# Patient Record
Sex: FEMALE | Race: WHITE | NOT HISPANIC OR LATINO | Employment: OTHER | URBAN - METROPOLITAN AREA
[De-identification: names, ages, dates, MRNs, and addresses within clinical notes are randomized per-mention and may not be internally consistent; named-entity substitution may affect disease eponyms.]

---

## 2023-01-30 PROBLEM — E78.00 ELEVATED LDL CHOLESTEROL LEVEL: Status: ACTIVE | Noted: 2023-01-30

## 2023-01-30 PROBLEM — R10.13 INTRACTABLE EPIGASTRIC ABDOMINAL PAIN: Status: ACTIVE | Noted: 2023-01-30

## 2023-01-30 PROBLEM — I10 ESSENTIAL (PRIMARY) HYPERTENSION: Status: ACTIVE | Noted: 2023-01-30

## 2023-01-30 PROBLEM — E87.6 LOW BLOOD POTASSIUM: Status: ACTIVE | Noted: 2023-01-30

## 2023-01-30 PROBLEM — B02.9 SHINGLES: Status: ACTIVE | Noted: 2023-01-30

## 2023-01-30 PROBLEM — H65.91 RIGHT SEROUS OTITIS MEDIA: Status: ACTIVE | Noted: 2023-01-30

## 2023-01-30 PROBLEM — M85.80 OSTEOPENIA: Status: ACTIVE | Noted: 2023-01-30

## 2023-01-30 PROBLEM — U07.1 COVID-19: Status: ACTIVE | Noted: 2023-01-30

## 2023-01-30 PROBLEM — J06.9 VIRAL URI WITH COUGH: Status: ACTIVE | Noted: 2023-01-30

## 2023-01-30 PROBLEM — K64.9 ACUTE HEMORRHOID: Status: ACTIVE | Noted: 2023-01-30

## 2023-01-30 RX ORDER — LOSARTAN POTASSIUM 100 MG/1
1 TABLET ORAL DAILY
COMMUNITY
Start: 2019-10-02 | End: 2023-05-18

## 2023-01-30 RX ORDER — ALENDRONATE SODIUM 70 MG/1
1 TABLET ORAL
COMMUNITY
Start: 2022-10-03 | End: 2023-03-14

## 2023-01-30 RX ORDER — AMLODIPINE BESYLATE 2.5 MG/1
1 TABLET ORAL DAILY
COMMUNITY
Start: 2022-01-14 | End: 2023-04-24

## 2023-01-30 RX ORDER — CHLORTHALIDONE 25 MG/1
1 TABLET ORAL DAILY
COMMUNITY
Start: 2021-05-03 | End: 2023-08-16

## 2023-01-30 RX ORDER — PHENOL 1.4 %
1 AEROSOL, SPRAY (ML) MUCOUS MEMBRANE DAILY
COMMUNITY
Start: 2020-03-09

## 2023-01-30 RX ORDER — POTASSIUM CHLORIDE 750 MG/1
1 TABLET, EXTENDED RELEASE ORAL DAILY
COMMUNITY
Start: 2022-04-26 | End: 2023-09-28

## 2023-01-30 RX ORDER — ASCORBIC ACID 500 MG
1 TABLET ORAL DAILY
COMMUNITY

## 2023-01-30 RX ORDER — ATORVASTATIN CALCIUM 10 MG/1
1 TABLET, FILM COATED ORAL DAILY
COMMUNITY
End: 2023-03-14

## 2023-03-14 ENCOUNTER — OFFICE VISIT (OUTPATIENT)
Dept: PRIMARY CARE | Facility: CLINIC | Age: 68
End: 2023-03-14
Payer: MEDICARE

## 2023-03-14 VITALS
DIASTOLIC BLOOD PRESSURE: 72 MMHG | HEIGHT: 64 IN | HEART RATE: 76 BPM | SYSTOLIC BLOOD PRESSURE: 124 MMHG | OXYGEN SATURATION: 98 % | WEIGHT: 160.4 LBS | BODY MASS INDEX: 27.39 KG/M2

## 2023-03-14 DIAGNOSIS — M85.88 OSTEOPENIA OF LUMBAR SPINE: Primary | ICD-10-CM

## 2023-03-14 DIAGNOSIS — I10 ESSENTIAL (PRIMARY) HYPERTENSION: ICD-10-CM

## 2023-03-14 DIAGNOSIS — E78.00 ELEVATED LDL CHOLESTEROL LEVEL: ICD-10-CM

## 2023-03-14 PROBLEM — E87.6 LOW BLOOD POTASSIUM: Status: RESOLVED | Noted: 2023-01-30 | Resolved: 2023-03-14

## 2023-03-14 PROBLEM — U07.1 COVID-19: Status: RESOLVED | Noted: 2023-01-30 | Resolved: 2023-03-14

## 2023-03-14 PROBLEM — J06.9 VIRAL URI WITH COUGH: Status: RESOLVED | Noted: 2023-01-30 | Resolved: 2023-03-14

## 2023-03-14 PROBLEM — H65.91 RIGHT SEROUS OTITIS MEDIA: Status: RESOLVED | Noted: 2023-01-30 | Resolved: 2023-03-14

## 2023-03-14 PROBLEM — K64.9 ACUTE HEMORRHOID: Status: RESOLVED | Noted: 2023-01-30 | Resolved: 2023-03-14

## 2023-03-14 PROBLEM — B02.9 SHINGLES: Status: RESOLVED | Noted: 2023-01-30 | Resolved: 2023-03-14

## 2023-03-14 PROBLEM — R10.13 INTRACTABLE EPIGASTRIC ABDOMINAL PAIN: Status: RESOLVED | Noted: 2023-01-30 | Resolved: 2023-03-14

## 2023-03-14 PROCEDURE — 1159F MED LIST DOCD IN RCRD: CPT | Performed by: NURSE PRACTITIONER

## 2023-03-14 PROCEDURE — 3074F SYST BP LT 130 MM HG: CPT | Performed by: NURSE PRACTITIONER

## 2023-03-14 PROCEDURE — 1160F RVW MEDS BY RX/DR IN RCRD: CPT | Performed by: NURSE PRACTITIONER

## 2023-03-14 PROCEDURE — 1036F TOBACCO NON-USER: CPT | Performed by: NURSE PRACTITIONER

## 2023-03-14 PROCEDURE — 3078F DIAST BP <80 MM HG: CPT | Performed by: NURSE PRACTITIONER

## 2023-03-14 PROCEDURE — 99214 OFFICE O/P EST MOD 30 MIN: CPT | Performed by: NURSE PRACTITIONER

## 2023-03-14 RX ORDER — HYDROCORTISONE 25 MG/G
CREAM TOPICAL
COMMUNITY
Start: 2022-11-15 | End: 2024-03-18 | Stop reason: ALTCHOICE

## 2023-03-14 NOTE — PATIENT INSTRUCTIONS
I placed an order for your mammogram you can complete when this is due.  I also ordered your routine blood work for you to complete in 6 months prior to your follow-up which should be a Medicare wellness.    Continue to go to the [facility] and work out continue to do resistance training as this can help with your osteopenia.  Also make sure you are taking vitamin D and calcium and eating a healthy well-balanced diet    Please follow-up per routine and as needed

## 2023-03-14 NOTE — ASSESSMENT & PLAN NOTE
Pt has not started taking the Fosamax.  She decided she did not want to try as it had too many side effects. She will take calcium and vitamin diet.  Recommend low weights she goes to [facility]

## 2023-03-14 NOTE — PROGRESS NOTES
"Subjective   Patient ID: Lis Brady is a 67 y.o. female who presents for Follow-up (6 mo).    Pt here for routine 6 months follow up       Review of Systems   All other systems reviewed and are negative.      Objective   /72   Pulse 76   Ht 1.626 m (5' 4\")   Wt 72.8 kg (160 lb 6.4 oz)   SpO2 98%   BMI 27.53 kg/m²     Physical Exam  Constitutional:       Appearance: Normal appearance. She is normal weight.   HENT:      Nose: Nose normal.   Cardiovascular:      Rate and Rhythm: Regular rhythm.      Heart sounds: Normal heart sounds.   Pulmonary:      Effort: Pulmonary effort is normal.      Breath sounds: Normal breath sounds.   Abdominal:      General: Bowel sounds are normal.      Palpations: Abdomen is soft.   Musculoskeletal:         General: Normal range of motion.   Skin:     General: Skin is warm.      Capillary Refill: Capillary refill takes less than 2 seconds.   Neurological:      General: No focal deficit present.   Psychiatric:         Mood and Affect: Mood normal.       Assessment/Plan   Problem List Items Addressed This Visit          Circulatory    Essential (primary) hypertension     Bp stable ,no changes to current medications            Musculoskeletal    Osteopenia - Primary     Pt has not started taking the Fosamax.  She decided she did not want to try as it had too many side effects. She will take calcium and vitamin diet.  Recommend low weights she goes to [facility]            Other    Elevated LDL cholesterol level     Continues to watch diet and exercise               "

## 2023-04-23 DIAGNOSIS — I10 ESSENTIAL (PRIMARY) HYPERTENSION: Primary | ICD-10-CM

## 2023-04-24 RX ORDER — AMLODIPINE BESYLATE 2.5 MG/1
2.5 TABLET ORAL DAILY
Qty: 90 TABLET | Refills: 2 | Status: SHIPPED | OUTPATIENT
Start: 2023-04-24 | End: 2023-10-30

## 2023-05-17 DIAGNOSIS — I10 ESSENTIAL (PRIMARY) HYPERTENSION: ICD-10-CM

## 2023-05-18 RX ORDER — LOSARTAN POTASSIUM 100 MG/1
100 TABLET ORAL DAILY
Qty: 90 TABLET | Refills: 2 | Status: SHIPPED | OUTPATIENT
Start: 2023-05-18 | End: 2023-11-20

## 2023-07-05 ENCOUNTER — OFFICE VISIT (OUTPATIENT)
Dept: PRIMARY CARE | Facility: CLINIC | Age: 68
End: 2023-07-05
Payer: MEDICARE

## 2023-07-05 VITALS
WEIGHT: 164.2 LBS | SYSTOLIC BLOOD PRESSURE: 128 MMHG | BODY MASS INDEX: 28.03 KG/M2 | OXYGEN SATURATION: 98 % | HEART RATE: 79 BPM | DIASTOLIC BLOOD PRESSURE: 80 MMHG | HEIGHT: 64 IN

## 2023-07-05 DIAGNOSIS — J01.00 ACUTE NON-RECURRENT MAXILLARY SINUSITIS: Primary | ICD-10-CM

## 2023-07-05 PROBLEM — Q82.8 POROKERATOSIS: Status: ACTIVE | Noted: 2017-05-17

## 2023-07-05 PROCEDURE — 99213 OFFICE O/P EST LOW 20 MIN: CPT | Performed by: NURSE PRACTITIONER

## 2023-07-05 PROCEDURE — 3074F SYST BP LT 130 MM HG: CPT | Performed by: NURSE PRACTITIONER

## 2023-07-05 PROCEDURE — 1160F RVW MEDS BY RX/DR IN RCRD: CPT | Performed by: NURSE PRACTITIONER

## 2023-07-05 PROCEDURE — 1159F MED LIST DOCD IN RCRD: CPT | Performed by: NURSE PRACTITIONER

## 2023-07-05 PROCEDURE — 1036F TOBACCO NON-USER: CPT | Performed by: NURSE PRACTITIONER

## 2023-07-05 PROCEDURE — 3079F DIAST BP 80-89 MM HG: CPT | Performed by: NURSE PRACTITIONER

## 2023-07-05 RX ORDER — AMOXICILLIN AND CLAVULANATE POTASSIUM 875; 125 MG/1; MG/1
1 TABLET, FILM COATED ORAL 2 TIMES DAILY
Qty: 20 TABLET | Refills: 0 | Status: SHIPPED | OUTPATIENT
Start: 2023-07-05 | End: 2023-07-15

## 2023-07-05 ASSESSMENT — PATIENT HEALTH QUESTIONNAIRE - PHQ9
2. FEELING DOWN, DEPRESSED OR HOPELESS: NOT AT ALL
SUM OF ALL RESPONSES TO PHQ9 QUESTIONS 1 AND 2: 0
1. LITTLE INTEREST OR PLEASURE IN DOING THINGS: NOT AT ALL

## 2023-07-05 ASSESSMENT — ENCOUNTER SYMPTOMS
HEADACHES: 1
SORE THROAT: 1
SINUS PRESSURE: 1
COUGH: 1
SHORTNESS OF BREATH: 0
DEPRESSION: 0
OCCASIONAL FEELINGS OF UNSTEADINESS: 0
LOSS OF SENSATION IN FEET: 0

## 2023-07-05 ASSESSMENT — COLUMBIA-SUICIDE SEVERITY RATING SCALE - C-SSRS
1. IN THE PAST MONTH, HAVE YOU WISHED YOU WERE DEAD OR WISHED YOU COULD GO TO SLEEP AND NOT WAKE UP?: NO
2. HAVE YOU ACTUALLY HAD ANY THOUGHTS OF KILLING YOURSELF?: NO
6. HAVE YOU EVER DONE ANYTHING, STARTED TO DO ANYTHING, OR PREPARED TO DO ANYTHING TO END YOUR LIFE?: NO

## 2023-07-05 NOTE — PROGRESS NOTES
"Subjective   Patient ID: Lis Brady is a 67 y.o. female who presents for Sinusitis and Headache (Sinus ).    Sinusitis  This is a new problem. The current episode started more than 1 month ago. The problem has been waxing and waning since onset. There has been no fever. The fever has been present for 5 days or more. Her pain is at a severity of 2/10. The pain is moderate. Associated symptoms include congestion, coughing, headaches, sinus pressure and a sore throat. Pertinent negatives include no ear pain or shortness of breath. Past treatments include nasal decongestants and saline nose sprays. The treatment provided mild relief.   Headache   This is a new problem. The current episode started 1 to 4 weeks ago. The problem occurs intermittently. The pain is located in the Frontal region. The pain does not radiate. The quality of the pain is described as aching. Associated symptoms include coughing, sinus pressure and a sore throat. Pertinent negatives include no ear pain. She has tried NSAIDs for the symptoms. The treatment provided mild relief.        Review of Systems   HENT:  Positive for congestion, sinus pressure and sore throat. Negative for ear pain.    Respiratory:  Positive for cough. Negative for shortness of breath.    Neurological:  Positive for headaches.       Objective   /80   Pulse 79   Ht 1.626 m (5' 4\")   Wt 74.5 kg (164 lb 3.2 oz)   SpO2 98%   BMI 28.18 kg/m²     Physical Exam  Vitals reviewed.   Constitutional:       Appearance: Normal appearance.   HENT:      Head: Normocephalic.      Salivary Glands: Right salivary gland is not diffusely enlarged or tender. Left salivary gland is not diffusely enlarged or tender.      Right Ear: Tympanic membrane, ear canal and external ear normal.      Left Ear: Tympanic membrane, ear canal and external ear normal.      Nose: Mucosal edema and congestion present.      Right Turbinates: Swollen.      Left Turbinates: Swollen.      Right Sinus: " Frontal sinus tenderness present.      Left Sinus: Frontal sinus tenderness present.   Cardiovascular:      Rate and Rhythm: Normal rate and regular rhythm.      Heart sounds: Normal heart sounds.   Pulmonary:      Effort: Pulmonary effort is normal.      Breath sounds: Normal breath sounds.   Skin:     General: Skin is warm.      Capillary Refill: Capillary refill takes less than 2 seconds.   Neurological:      Mental Status: She is alert and oriented to person, place, and time.   Psychiatric:         Mood and Affect: Mood and affect normal.         Speech: Speech normal.         Assessment/Plan   Problem List Items Addressed This Visit    None  Visit Diagnoses       Acute non-recurrent maxillary sinusitis    -  Primary    Relevant Medications    amoxicillin-pot clavulanate (Augmentin) 875-125 mg tablet

## 2023-08-15 DIAGNOSIS — I10 ESSENTIAL (PRIMARY) HYPERTENSION: Primary | ICD-10-CM

## 2023-08-16 RX ORDER — CHLORTHALIDONE 25 MG/1
25 TABLET ORAL DAILY
Qty: 100 TABLET | Refills: 2 | Status: SHIPPED | OUTPATIENT
Start: 2023-08-16 | End: 2024-05-21 | Stop reason: SDUPTHER

## 2023-09-12 ENCOUNTER — TELEPHONE (OUTPATIENT)
Dept: PRIMARY CARE | Facility: CLINIC | Age: 68
End: 2023-09-12

## 2023-09-12 ENCOUNTER — LAB (OUTPATIENT)
Dept: LAB | Facility: LAB | Age: 68
End: 2023-09-12
Payer: MEDICARE

## 2023-09-12 DIAGNOSIS — I10 ESSENTIAL (PRIMARY) HYPERTENSION: ICD-10-CM

## 2023-09-12 DIAGNOSIS — E78.00 ELEVATED LDL CHOLESTEROL LEVEL: ICD-10-CM

## 2023-09-12 LAB
ALANINE AMINOTRANSFERASE (SGPT) (U/L) IN SER/PLAS: 13 U/L (ref 7–45)
ALBUMIN (G/DL) IN SER/PLAS: 4 G/DL (ref 3.4–5)
ALKALINE PHOSPHATASE (U/L) IN SER/PLAS: 69 U/L (ref 33–136)
ANION GAP IN SER/PLAS: 13 MMOL/L (ref 10–20)
ASPARTATE AMINOTRANSFERASE (SGOT) (U/L) IN SER/PLAS: 17 U/L (ref 9–39)
BILIRUBIN TOTAL (MG/DL) IN SER/PLAS: 0.5 MG/DL (ref 0–1.2)
CALCIUM (MG/DL) IN SER/PLAS: 9.3 MG/DL (ref 8.6–10.3)
CARBON DIOXIDE, TOTAL (MMOL/L) IN SER/PLAS: 30 MMOL/L (ref 21–32)
CHLORIDE (MMOL/L) IN SER/PLAS: 101 MMOL/L (ref 98–107)
CHOLESTEROL (MG/DL) IN SER/PLAS: 198 MG/DL (ref 0–199)
CHOLESTEROL IN HDL (MG/DL) IN SER/PLAS: 49.4 MG/DL
CHOLESTEROL/HDL RATIO: 4
CREATININE (MG/DL) IN SER/PLAS: 0.76 MG/DL (ref 0.5–1.05)
GFR FEMALE: 86 ML/MIN/1.73M2
GLUCOSE (MG/DL) IN SER/PLAS: 87 MG/DL (ref 74–99)
LDL: 125 MG/DL (ref 0–99)
POTASSIUM (MMOL/L) IN SER/PLAS: 3.7 MMOL/L (ref 3.5–5.3)
PROTEIN TOTAL: 6.3 G/DL (ref 6.4–8.2)
SODIUM (MMOL/L) IN SER/PLAS: 140 MMOL/L (ref 136–145)
TRIGLYCERIDE (MG/DL) IN SER/PLAS: 120 MG/DL (ref 0–149)
UREA NITROGEN (MG/DL) IN SER/PLAS: 15 MG/DL (ref 6–23)
VLDL: 24 MG/DL (ref 0–40)

## 2023-09-12 PROCEDURE — 80053 COMPREHEN METABOLIC PANEL: CPT

## 2023-09-12 PROCEDURE — 36415 COLL VENOUS BLD VENIPUNCTURE: CPT

## 2023-09-12 PROCEDURE — 80061 LIPID PANEL: CPT

## 2023-09-15 ENCOUNTER — OFFICE VISIT (OUTPATIENT)
Dept: PRIMARY CARE | Facility: CLINIC | Age: 68
End: 2023-09-15
Payer: MEDICARE

## 2023-09-15 VITALS
WEIGHT: 165.4 LBS | OXYGEN SATURATION: 99 % | HEIGHT: 64 IN | HEART RATE: 68 BPM | SYSTOLIC BLOOD PRESSURE: 122 MMHG | BODY MASS INDEX: 28.24 KG/M2 | DIASTOLIC BLOOD PRESSURE: 80 MMHG

## 2023-09-15 DIAGNOSIS — Z12.31 ENCOUNTER FOR SCREENING MAMMOGRAM FOR BREAST CANCER: ICD-10-CM

## 2023-09-15 DIAGNOSIS — Z00.00 ROUTINE GENERAL MEDICAL EXAMINATION AT HEALTH CARE FACILITY: Primary | ICD-10-CM

## 2023-09-15 DIAGNOSIS — E78.00 ELEVATED LDL CHOLESTEROL LEVEL: ICD-10-CM

## 2023-09-15 DIAGNOSIS — Z12.11 SCREENING FOR COLORECTAL CANCER: ICD-10-CM

## 2023-09-15 DIAGNOSIS — Z12.12 SCREENING FOR COLORECTAL CANCER: ICD-10-CM

## 2023-09-15 DIAGNOSIS — I10 ESSENTIAL (PRIMARY) HYPERTENSION: ICD-10-CM

## 2023-09-15 PROCEDURE — 1036F TOBACCO NON-USER: CPT | Performed by: NURSE PRACTITIONER

## 2023-09-15 PROCEDURE — 3074F SYST BP LT 130 MM HG: CPT | Performed by: NURSE PRACTITIONER

## 2023-09-15 PROCEDURE — 3079F DIAST BP 80-89 MM HG: CPT | Performed by: NURSE PRACTITIONER

## 2023-09-15 PROCEDURE — 1170F FXNL STATUS ASSESSED: CPT | Performed by: NURSE PRACTITIONER

## 2023-09-15 PROCEDURE — 1159F MED LIST DOCD IN RCRD: CPT | Performed by: NURSE PRACTITIONER

## 2023-09-15 PROCEDURE — G0439 PPPS, SUBSEQ VISIT: HCPCS | Performed by: NURSE PRACTITIONER

## 2023-09-15 PROCEDURE — 1160F RVW MEDS BY RX/DR IN RCRD: CPT | Performed by: NURSE PRACTITIONER

## 2023-09-15 ASSESSMENT — ACTIVITIES OF DAILY LIVING (ADL)
DRESSING: INDEPENDENT
DOING_HOUSEWORK: INDEPENDENT
TAKING_MEDICATION: INDEPENDENT
BATHING: INDEPENDENT
MANAGING_FINANCES: INDEPENDENT
GROCERY_SHOPPING: INDEPENDENT

## 2023-09-15 ASSESSMENT — ENCOUNTER SYMPTOMS
LOSS OF SENSATION IN FEET: 0
MUSCULOSKELETAL NEGATIVE: 1
PSYCHIATRIC NEGATIVE: 1
RESPIRATORY NEGATIVE: 1
CARDIOVASCULAR NEGATIVE: 1
DEPRESSION: 0
CONSTITUTIONAL NEGATIVE: 1
NEUROLOGICAL NEGATIVE: 1
OCCASIONAL FEELINGS OF UNSTEADINESS: 0

## 2023-09-15 ASSESSMENT — COLUMBIA-SUICIDE SEVERITY RATING SCALE - C-SSRS
6. HAVE YOU EVER DONE ANYTHING, STARTED TO DO ANYTHING, OR PREPARED TO DO ANYTHING TO END YOUR LIFE?: NO
1. IN THE PAST MONTH, HAVE YOU WISHED YOU WERE DEAD OR WISHED YOU COULD GO TO SLEEP AND NOT WAKE UP?: NO
2. HAVE YOU ACTUALLY HAD ANY THOUGHTS OF KILLING YOURSELF?: NO

## 2023-09-15 NOTE — PATIENT INSTRUCTIONS
Mammogram: It's time for your routine mammogram. Regular screenings are essential for early detection and prevention.    Cologuard Test: I noticed your last Cologuard test was done over three years ago. Given its importance in colorectal cancer screening, I've placed a new order for you. Kindly complete this test at your earliest convenience.    Blood Work Review: We went over your blood tests today. I'm pleased to inform you that everything looks stable and is in good order.    Dietary and Exercise Recommendations:  Continue to focus on a healthy, well-balanced diet. Proper nutrition is integral to overall well-being.    Regular exercise remains essential. It's not just for physical health but also benefits mental and cardiovascular health. I'm encouraged by your commitment in this area.    Blood Pressure: Your blood pressure is excellent and stable at 122/80, especially given that you're on a very low dose of amlodipine.    Medications: There are no changes to your current medications. Continue taking them as prescribed.    Next Steps: Please schedule a follow-up appointment in about 6 months. However, if any concerns arise before then, don't hesitate to reach out sooner.

## 2023-09-15 NOTE — ASSESSMENT & PLAN NOTE
Significant improvement in lipid panel from previous year reviewed with patient to continue to eat healthy well-balanced diet.

## 2023-09-15 NOTE — PROGRESS NOTES
"Subjective   Reason for Visit: Lis Brady is an 67 y.o. female here for a Medicare Wellness visit.     Past Medical, Surgical, and Family History reviewed and updated in chart.    Reviewed all medications by prescribing practitioner or clinical pharmacist (such as prescriptions, OTCs, herbal therapies and supplements) and documented in the medical record.    Pt here for routine follow up and mammogram.    She has a tender and skin irritation on left breast.  She reports bra tends to irritated it.     Exercises routinely: walks and bike rides. Goes to [facility]        Patient Care Team:  MARLENE Bacon as PCP - General  MARLENE Bacon as PCP - [employer] PCP     Review of Systems   Constitutional: Negative.    HENT: Negative.     Respiratory: Negative.     Cardiovascular: Negative.    Genitourinary: Negative.    Musculoskeletal: Negative.    Skin: Negative.    Neurological: Negative.    Psychiatric/Behavioral: Negative.         Objective   Vitals:  /80   Pulse 68   Ht 1.626 m (5' 4\")   Wt 75 kg (165 lb 6.4 oz)   SpO2 99%   BMI 28.39 kg/m²       Physical Exam  Vitals reviewed.   HENT:      Head: Normocephalic.      Nose: Nose normal.   Eyes:      Extraocular Movements: Extraocular movements intact.      Conjunctiva/sclera: Conjunctivae normal.   Cardiovascular:      Rate and Rhythm: Normal rate.      Heart sounds: Normal heart sounds.   Pulmonary:      Effort: Pulmonary effort is normal.      Breath sounds: Normal breath sounds.   Abdominal:      General: Bowel sounds are normal. There is no distension.      Palpations: Abdomen is soft. There is no mass.      Tenderness: There is no right CVA tenderness or left CVA tenderness.   Musculoskeletal:         General: Normal range of motion.      Cervical back: Normal range of motion.   Skin:     General: Skin is warm.      Capillary Refill: Capillary refill takes less than 2 seconds.   Neurological:      Mental Status: She " is alert and oriented to person, place, and time.   Psychiatric:         Mood and Affect: Mood normal.         Behavior: Behavior normal.         Thought Content: Thought content normal.         Judgment: Judgment normal.         Assessment/Plan   Problem List Items Addressed This Visit       Elevated LDL cholesterol level    Current Assessment & Plan     Significant improvement in lipid panel from previous year reviewed with patient to continue to eat healthy well-balanced diet.         Essential (primary) hypertension    Current Assessment & Plan     Blood pressure stable no changes to medications          Other Visit Diagnoses       Routine general medical examination at health care facility    -  Primary    Encounter for screening mammogram for breast cancer        Relevant Orders    BI mammo bilateral screening tomosynthesis    Screening for colorectal cancer        Relevant Orders    Cologuard® colon cancer screening          Routine Medicare wellness completed no concerns on blood work reviewed with patient at visit

## 2023-09-23 LAB — NONINV COLON CA DNA+OCC BLD SCRN STL QL: NEGATIVE

## 2023-09-25 ENCOUNTER — TELEPHONE (OUTPATIENT)
Dept: PRIMARY CARE | Facility: CLINIC | Age: 68
End: 2023-09-25
Payer: MEDICARE

## 2023-09-27 DIAGNOSIS — I10 ESSENTIAL (PRIMARY) HYPERTENSION: Primary | ICD-10-CM

## 2023-09-28 RX ORDER — POTASSIUM CHLORIDE 750 MG/1
10 TABLET, EXTENDED RELEASE ORAL DAILY
Qty: 100 TABLET | Refills: 2 | Status: SHIPPED | OUTPATIENT
Start: 2023-09-28

## 2023-10-09 ENCOUNTER — HOSPITAL ENCOUNTER (OUTPATIENT)
Dept: RADIOLOGY | Facility: HOSPITAL | Age: 68
Discharge: HOME | End: 2023-10-09
Payer: MEDICARE

## 2023-10-09 DIAGNOSIS — Z12.31 ENCOUNTER FOR SCREENING MAMMOGRAM FOR BREAST CANCER: ICD-10-CM

## 2023-10-09 PROCEDURE — 77067 SCR MAMMO BI INCL CAD: CPT | Mod: BILATERAL PROCEDURE | Performed by: STUDENT IN AN ORGANIZED HEALTH CARE EDUCATION/TRAINING PROGRAM

## 2023-10-09 PROCEDURE — 77063 BREAST TOMOSYNTHESIS BI: CPT | Mod: BILATERAL PROCEDURE | Performed by: STUDENT IN AN ORGANIZED HEALTH CARE EDUCATION/TRAINING PROGRAM

## 2023-10-09 PROCEDURE — 77063 BREAST TOMOSYNTHESIS BI: CPT | Mod: 50

## 2023-10-09 PROCEDURE — 77067 SCR MAMMO BI INCL CAD: CPT

## 2023-10-27 DIAGNOSIS — I10 ESSENTIAL (PRIMARY) HYPERTENSION: ICD-10-CM

## 2023-10-30 RX ORDER — AMLODIPINE BESYLATE 2.5 MG/1
2.5 TABLET ORAL DAILY
Qty: 100 TABLET | Refills: 2 | Status: SHIPPED | OUTPATIENT
Start: 2023-10-30

## 2023-11-18 DIAGNOSIS — I10 ESSENTIAL (PRIMARY) HYPERTENSION: ICD-10-CM

## 2023-11-20 RX ORDER — METRONIDAZOLE 7.5 MG/G
GEL TOPICAL
COMMUNITY
Start: 2023-11-09

## 2023-11-20 RX ORDER — LOSARTAN POTASSIUM 100 MG/1
100 TABLET ORAL DAILY
Qty: 100 TABLET | Refills: 0 | Status: SHIPPED | OUTPATIENT
Start: 2023-11-20 | End: 2024-01-30

## 2024-01-27 DIAGNOSIS — I10 ESSENTIAL (PRIMARY) HYPERTENSION: ICD-10-CM

## 2024-01-30 RX ORDER — LOSARTAN POTASSIUM 100 MG/1
100 TABLET ORAL DAILY
Qty: 100 TABLET | Refills: 2 | Status: SHIPPED | OUTPATIENT
Start: 2024-01-30

## 2024-03-15 ENCOUNTER — APPOINTMENT (OUTPATIENT)
Dept: PRIMARY CARE | Facility: CLINIC | Age: 69
End: 2024-03-15
Payer: MEDICARE

## 2024-03-17 ASSESSMENT — ENCOUNTER SYMPTOMS
BLURRED VISION: 0
ORTHOPNEA: 0
HYPERTENSION: 1
PND: 0
PALPITATIONS: 0
SHORTNESS OF BREATH: 0
NECK PAIN: 0
SWEATS: 0
HEADACHES: 0

## 2024-03-18 ENCOUNTER — OFFICE VISIT (OUTPATIENT)
Dept: PRIMARY CARE | Facility: CLINIC | Age: 69
End: 2024-03-18
Payer: MEDICARE

## 2024-03-18 VITALS
BODY MASS INDEX: 28.57 KG/M2 | SYSTOLIC BLOOD PRESSURE: 123 MMHG | HEIGHT: 64 IN | TEMPERATURE: 97.6 F | OXYGEN SATURATION: 98 % | HEART RATE: 68 BPM | WEIGHT: 167.38 LBS | DIASTOLIC BLOOD PRESSURE: 74 MMHG

## 2024-03-18 DIAGNOSIS — H65.01 RIGHT ACUTE SEROUS OTITIS MEDIA, RECURRENCE NOT SPECIFIED: ICD-10-CM

## 2024-03-18 DIAGNOSIS — I10 ESSENTIAL (PRIMARY) HYPERTENSION: ICD-10-CM

## 2024-03-18 DIAGNOSIS — L72.9 SUBCUTANEOUS CYST: Primary | ICD-10-CM

## 2024-03-18 PROCEDURE — 3074F SYST BP LT 130 MM HG: CPT | Performed by: FAMILY MEDICINE

## 2024-03-18 PROCEDURE — 1159F MED LIST DOCD IN RCRD: CPT | Performed by: FAMILY MEDICINE

## 2024-03-18 PROCEDURE — 1036F TOBACCO NON-USER: CPT | Performed by: FAMILY MEDICINE

## 2024-03-18 PROCEDURE — 3078F DIAST BP <80 MM HG: CPT | Performed by: FAMILY MEDICINE

## 2024-03-18 PROCEDURE — 99214 OFFICE O/P EST MOD 30 MIN: CPT | Performed by: FAMILY MEDICINE

## 2024-03-18 ASSESSMENT — ENCOUNTER SYMPTOMS
PALPITATIONS: 0
WHEEZING: 0
LOSS OF SENSATION IN FEET: 0
FEVER: 0
HYPERTENSION: 1
ORTHOPNEA: 0
BLOOD IN STOOL: 0
CONFUSION: 0
DIZZINESS: 0
VOMITING: 0
LIGHT-HEADEDNESS: 0
NECK PAIN: 0
SHORTNESS OF BREATH: 0
DIARRHEA: 0
PND: 0
BLURRED VISION: 0
CHILLS: 0
DEPRESSION: 0
SWEATS: 0
NAUSEA: 0
UNEXPECTED WEIGHT CHANGE: 0
COUGH: 0
WEAKNESS: 0
ABDOMINAL PAIN: 0
OCCASIONAL FEELINGS OF UNSTEADINESS: 0
NUMBNESS: 0
TROUBLE SWALLOWING: 0
HEADACHES: 0
DYSURIA: 0
DIFFICULTY URINATING: 0

## 2024-03-18 ASSESSMENT — PATIENT HEALTH QUESTIONNAIRE - PHQ9
SUM OF ALL RESPONSES TO PHQ9 QUESTIONS 1 AND 2: 0
2. FEELING DOWN, DEPRESSED OR HOPELESS: NOT AT ALL
1. LITTLE INTEREST OR PLEASURE IN DOING THINGS: NOT AT ALL

## 2024-03-18 NOTE — PATIENT INSTRUCTIONS
Monitor your resting blood pressure (BP) and heart rate (HR) at home. Resting BP should be fairly consistently better than 140/90, preferably better than 130/80. Please bring your blood pressure cuff to your appointments.  For a list of validated BP cuffs: https://www.validatebp.org/    Please try warm compresses on your thumb. Please follow-up with a hand surgeon if the bump does not clear up within the next couple weeks.    Hand Surgery  Dr. Weston Carr ([place]) [phone]  [facility] [phone]    Please try a nasal steroid spray for a couple weeks. If hearing does not return to normal, or symptoms change/worsen, please follow-up with an ENT specialist.    Otolarynglogy (ENT)  Dr. Mouna Oliveira ([employer]) [phone]  Dr. Loy Carreon, Dagoberto Davis, Caio Fan ([place]) [phone]  Dr. Sandra Leblanc ([place]) [phone]  Dr. Harry Benitez ([place]) [phone]      Please return for a(n) medication follow-up appointment in 6 months, earlier if any question or concern. Please return for your next Wellness visit within the next 1-2 months, or 12 months after your last Wellness visit.    Avoid taking Biotin for a week prior to any blood tests, as it can interfere with certain results. Fasting for labs means 12 hours, nothing to eat or drink, except water and medications, unless directed otherwise.    For assistance with scheduling referrals or consultations, please call [phone]. For laboratory, radiology, and other tests, please call [phone] ([phone] for pediatrics). Please review prescription inserts and published information for possible adverse effects of all medications. Return after testing or consultation to review results and recommendations, if symptoms persist, change, worsen, or return, or if you have any question or concern. If you do not get results within 7-10 days, or you have any question or concern, please send a message, call the office  ([phone]), or return to the office for a follow-up appointment. For non-emergencies, you may call the office. For emergencies, call 9-1-1 or go to the nearest Emergency Department. Please schedule additional appointment(s) to address concern(s) not addressed today.    In general, results are not released or discussed over the telephone, but at an appointment or via  [employer]. Results of tests done through [facility] are released via  [employer] (see below).  [email or web address]   [employer] support line: [phone]

## 2024-03-18 NOTE — PROGRESS NOTES
Subjective   Patient ID: Lis Brady is a 68 y.o. female who presents for Follow-up (Pt presents for 6 month check up, c/o R ear being plugged, bump on R thumb feels like something is in it, no rx's needed.BL).  Hypertension  This is a chronic problem. The current episode started more than 1 year ago. The problem has been waxing and waning since onset. The problem is controlled. Pertinent negatives include no anxiety, blurred vision, chest pain, headaches, malaise/fatigue, neck pain, orthopnea, palpitations, peripheral edema, PND, shortness of breath or sweats. There are no associated agents to hypertension. Risk factors for coronary artery disease include dyslipidemia and post-menopausal state. There are no compliance problems.        Generally feeling well.     c/o right ear is clogged. Chronic problem both with wax and plugging up on airplane descent. Just got home from [place]. Little bit of a cold starting since getting back (Wednesday got back).   Denies discharge, drainage, pain.    c/o right thumb bump under the skin. Started out red, tender, and swollen about 6 weeks ago. Noticed it because she was working a lot with her hands, moving boxes, etc. Denies injury. Tried nothing. No more redness, tenderness, swelling except for the remaining bump.    Review of Systems   Constitutional:  Negative for chills, fever, malaise/fatigue and unexpected weight change.   HENT:  Positive for hearing loss. Negative for ear discharge, ear pain and trouble swallowing.    Eyes:  Negative for blurred vision.   Respiratory:  Negative for cough, shortness of breath and wheezing.    Cardiovascular:  Negative for chest pain, palpitations, orthopnea and PND.   Gastrointestinal:  Negative for abdominal pain, blood in stool, diarrhea, nausea and vomiting.   Genitourinary:  Negative for difficulty urinating and dysuria.   Musculoskeletal:  Negative for neck pain.   Skin:  Negative for rash.   Neurological:  Negative for dizziness,  "syncope, weakness, light-headedness, numbness and headaches.   Psychiatric/Behavioral:  Negative for behavioral problems and confusion.          Objective   /74   Pulse 68   Temp 36.4 °C (97.6 °F)   Ht 1.632 m (5' 4.25\")   Wt 75.9 kg (167 lb 6 oz)   SpO2 98%   BMI 28.51 kg/m²     Physical Exam  Vitals and nursing note reviewed.   Constitutional:       General: She is not in acute distress.     Appearance: Normal appearance.   HENT:      Head: Normocephalic and atraumatic.      Right Ear: Ear canal and external ear normal. A middle ear effusion (serous; behind thin flake of skin or cerumen easily moved out of the way with lighted curette) is present. Tympanic membrane is not injected, perforated, erythematous, retracted or bulging.      Left Ear: Tympanic membrane, ear canal and external ear normal. There is no impacted cerumen.   Eyes:      General: No scleral icterus.     Extraocular Movements: Extraocular movements intact.      Conjunctiva/sclera: Conjunctivae normal.   Pulmonary:      Effort: Pulmonary effort is normal. No respiratory distress.   Skin:     General: Skin is warm and dry.      Coloration: Skin is not jaundiced.      Findings: Lesion (right thumb non-tender small subcutaneous nodule medial to the nail) present.   Neurological:      Mental Status: She is alert and oriented to person, place, and time. Mental status is at baseline.   Psychiatric:         Behavior: Behavior normal.         Assessment/Plan   Problem List Items Addressed This Visit       Essential (primary) hypertension     Well controlled.          Right acute serous otitis media     Nasal steroid. May consider antibiotic if pain develops. ENT if symptoms persist.         Subcutaneous cyst - Primary     Try warm compresses. Hand surgeon if persists.                         "

## 2024-05-21 ENCOUNTER — TELEPHONE (OUTPATIENT)
Dept: PRIMARY CARE | Facility: CLINIC | Age: 69
End: 2024-05-21
Payer: MEDICARE

## 2024-05-21 DIAGNOSIS — I10 ESSENTIAL (PRIMARY) HYPERTENSION: ICD-10-CM

## 2024-05-21 RX ORDER — CHLORTHALIDONE 25 MG/1
25 TABLET ORAL DAILY
Qty: 100 TABLET | Refills: 1 | Status: SHIPPED | OUTPATIENT
Start: 2024-05-21

## 2024-05-21 NOTE — TELEPHONE ENCOUNTER
Rx Refill Request Telephone Encounter    Name:  Lis Brady  :  [id number]  Medication Name:    CHLORTHALIDONE 25MG Q/D 90 DAY    Specific Pharmacy location:  [facility]  Date of last appointment:  2024  Date of next appointment:  2024  Best number to reach patient:  [phone]

## 2024-08-13 ENCOUNTER — TELEPHONE (OUTPATIENT)
Dept: PRIMARY CARE | Facility: CLINIC | Age: 69
End: 2024-08-13
Payer: MEDICARE

## 2024-08-13 DIAGNOSIS — I10 ESSENTIAL (PRIMARY) HYPERTENSION: ICD-10-CM

## 2024-08-13 NOTE — TELEPHONE ENCOUNTER
MEDICATION REFILL    Pharmacy Name:    [facility]  Medication requested            Amlodipine  2.5 mg  Dosage   1 tab daily  Quantity           90 days    Allergies   none given  Date of last visit   03/18/2024  Date of Next Appointment   09/19/2024

## 2024-08-14 RX ORDER — AMLODIPINE BESYLATE 2.5 MG/1
2.5 TABLET ORAL DAILY
Qty: 100 TABLET | Refills: 0 | Status: SHIPPED | OUTPATIENT
Start: 2024-08-14

## 2024-09-19 ENCOUNTER — APPOINTMENT (OUTPATIENT)
Dept: PRIMARY CARE | Facility: CLINIC | Age: 69
End: 2024-09-19
Payer: MEDICARE

## 2024-09-19 ENCOUNTER — LAB (OUTPATIENT)
Dept: LAB | Facility: LAB | Age: 69
End: 2024-09-19
Payer: MEDICARE

## 2024-09-19 VITALS
DIASTOLIC BLOOD PRESSURE: 74 MMHG | HEIGHT: 64 IN | HEART RATE: 69 BPM | SYSTOLIC BLOOD PRESSURE: 139 MMHG | BODY MASS INDEX: 29.6 KG/M2 | TEMPERATURE: 97.7 F | OXYGEN SATURATION: 97 % | WEIGHT: 173.38 LBS

## 2024-09-19 DIAGNOSIS — Z78.0 MENOPAUSE: ICD-10-CM

## 2024-09-19 DIAGNOSIS — Z11.59 NEED FOR HEPATITIS C SCREENING TEST: ICD-10-CM

## 2024-09-19 DIAGNOSIS — E87.6 HYPOKALEMIA: ICD-10-CM

## 2024-09-19 DIAGNOSIS — Z12.31 BREAST CANCER SCREENING BY MAMMOGRAM: ICD-10-CM

## 2024-09-19 DIAGNOSIS — Z00.00 MEDICARE ANNUAL WELLNESS VISIT, SUBSEQUENT: Primary | ICD-10-CM

## 2024-09-19 DIAGNOSIS — I10 ESSENTIAL (PRIMARY) HYPERTENSION: ICD-10-CM

## 2024-09-19 DIAGNOSIS — E78.00 PURE HYPERCHOLESTEROLEMIA: ICD-10-CM

## 2024-09-19 DIAGNOSIS — Z13.820 SCREENING FOR OSTEOPOROSIS: ICD-10-CM

## 2024-09-19 LAB
ANION GAP SERPL CALC-SCNC: 16 MMOL/L (ref 10–20)
BUN SERPL-MCNC: 21 MG/DL (ref 6–23)
CALCIUM SERPL-MCNC: 9.8 MG/DL (ref 8.6–10.3)
CHLORIDE SERPL-SCNC: 102 MMOL/L (ref 98–107)
CO2 SERPL-SCNC: 26 MMOL/L (ref 21–32)
CREAT SERPL-MCNC: 0.77 MG/DL (ref 0.5–1.05)
EGFRCR SERPLBLD CKD-EPI 2021: 85 ML/MIN/1.73M*2
GLUCOSE SERPL-MCNC: 82 MG/DL (ref 74–99)
HCV AB SER QL: NONREACTIVE
MAGNESIUM SERPL-MCNC: 1.83 MG/DL (ref 1.6–2.4)
POTASSIUM SERPL-SCNC: 3.7 MMOL/L (ref 3.5–5.3)
SODIUM SERPL-SCNC: 140 MMOL/L (ref 136–145)
TSH SERPL-ACNC: 2.23 MIU/L (ref 0.44–3.98)

## 2024-09-19 PROCEDURE — 1123F ACP DISCUSS/DSCN MKR DOCD: CPT | Performed by: FAMILY MEDICINE

## 2024-09-19 PROCEDURE — 3008F BODY MASS INDEX DOCD: CPT | Performed by: FAMILY MEDICINE

## 2024-09-19 PROCEDURE — 1160F RVW MEDS BY RX/DR IN RCRD: CPT | Performed by: FAMILY MEDICINE

## 2024-09-19 PROCEDURE — 80048 BASIC METABOLIC PNL TOTAL CA: CPT

## 2024-09-19 PROCEDURE — 1158F ADVNC CARE PLAN TLK DOCD: CPT | Performed by: FAMILY MEDICINE

## 2024-09-19 PROCEDURE — G0439 PPPS, SUBSEQ VISIT: HCPCS | Performed by: FAMILY MEDICINE

## 2024-09-19 PROCEDURE — 99214 OFFICE O/P EST MOD 30 MIN: CPT | Performed by: FAMILY MEDICINE

## 2024-09-19 PROCEDURE — 3075F SYST BP GE 130 - 139MM HG: CPT | Performed by: FAMILY MEDICINE

## 2024-09-19 PROCEDURE — 1159F MED LIST DOCD IN RCRD: CPT | Performed by: FAMILY MEDICINE

## 2024-09-19 PROCEDURE — 3078F DIAST BP <80 MM HG: CPT | Performed by: FAMILY MEDICINE

## 2024-09-19 PROCEDURE — 36415 COLL VENOUS BLD VENIPUNCTURE: CPT

## 2024-09-19 PROCEDURE — 83735 ASSAY OF MAGNESIUM: CPT

## 2024-09-19 PROCEDURE — 86803 HEPATITIS C AB TEST: CPT

## 2024-09-19 PROCEDURE — 84443 ASSAY THYROID STIM HORMONE: CPT

## 2024-09-19 PROCEDURE — 1170F FXNL STATUS ASSESSED: CPT | Performed by: FAMILY MEDICINE

## 2024-09-19 PROCEDURE — 1036F TOBACCO NON-USER: CPT | Performed by: FAMILY MEDICINE

## 2024-09-19 RX ORDER — CHLORTHALIDONE 25 MG/1
25 TABLET ORAL DAILY
Qty: 100 TABLET | Refills: 3 | Status: SHIPPED | OUTPATIENT
Start: 2024-09-19

## 2024-09-19 RX ORDER — POTASSIUM CHLORIDE 750 MG/1
10 TABLET, FILM COATED, EXTENDED RELEASE ORAL DAILY
Qty: 100 TABLET | Refills: 3 | Status: SHIPPED | OUTPATIENT
Start: 2024-09-19

## 2024-09-19 RX ORDER — AMLODIPINE BESYLATE 2.5 MG/1
2.5 TABLET ORAL DAILY
Qty: 100 TABLET | Refills: 3 | Status: SHIPPED | OUTPATIENT
Start: 2024-09-19

## 2024-09-19 RX ORDER — LOSARTAN POTASSIUM 100 MG/1
100 TABLET ORAL DAILY
Qty: 100 TABLET | Refills: 3 | Status: SHIPPED | OUTPATIENT
Start: 2024-09-19

## 2024-09-19 ASSESSMENT — ACTIVITIES OF DAILY LIVING (ADL)
DOING_HOUSEWORK: INDEPENDENT
DRESSING: INDEPENDENT
BATHING: INDEPENDENT
MANAGING_FINANCES: INDEPENDENT
TAKING_MEDICATION: INDEPENDENT
GROCERY_SHOPPING: INDEPENDENT

## 2024-09-19 ASSESSMENT — ENCOUNTER SYMPTOMS
DIAPHORESIS: 0
CHOKING: 0
FEVER: 0
PALPITATIONS: 0
LIGHT-HEADEDNESS: 0
APNEA: 0
LOSS OF SENSATION IN FEET: 0
OCCASIONAL FEELINGS OF UNSTEADINESS: 0
HEADACHES: 0
CHILLS: 0
DIZZINESS: 0
DEPRESSION: 0
WHEEZING: 0
UNEXPECTED WEIGHT CHANGE: 0
SHORTNESS OF BREATH: 0
COUGH: 0
CHEST TIGHTNESS: 0

## 2024-09-19 ASSESSMENT — PATIENT HEALTH QUESTIONNAIRE - PHQ9
1. LITTLE INTEREST OR PLEASURE IN DOING THINGS: NOT AT ALL
2. FEELING DOWN, DEPRESSED OR HOPELESS: NOT AT ALL
SUM OF ALL RESPONSES TO PHQ9 QUESTIONS 1 AND 2: 0

## 2024-09-19 NOTE — ASSESSMENT & PLAN NOTE
Well controlled.   Orders:    potassium chloride CR 10 mEq ER tablet; Take 1 tablet (10 mEq) by mouth once daily.    amLODIPine (Norvasc) 2.5 mg tablet; Take 1 tablet (2.5 mg) by mouth once daily. as directed    chlorthalidone (Hygroton) 25 mg tablet; Take 1 tablet (25 mg) by mouth once daily.    losartan (Cozaar) 100 mg tablet; Take 1 tablet (100 mg) by mouth once daily.    TSH with reflex to Free T4 if abnormal; Future

## 2024-09-19 NOTE — PATIENT INSTRUCTIONS
"The CT coronary artery calcium (CAC) score is calculated from a CT scan of the heart done without contrast and synchronized with the heartbeat to quantify calcifications in the coronary arteries. The CAC score can help to estimate the risk of heart attack, and can help guide therapy decisions (e.g., whether or not to start a statin, see a cardiologist, etc.). The CT will miss significant lesions about 5% of the time. False positives are rare, but can happen, and could lead to additional \"unnecessary\" testing, procedures, referrals, anxiety, etc. CT CAC scoring can not identify narrowing of the arteries. There is a possibility of identifying things that may have otherwise gone unnoticed, which may be a good thing, but which may instead lead to \"unnecessary\" testing, procedures, referrals, anxiety, etc. The CAC score is generally not repeated more frequently than every 5 years.    Please return for a(n) blood pressure, potassium, and medication follow-up appointment in 6 months, earlier if any question or concern. Please return for your next Wellness visit in 12 months. Please schedule additional problem-focused appointment(s) to address additional problem(s).    Monitor your resting blood pressure (BP) and heart rate (HR) at home. Resting BP should be fairly consistently lower than 140/90, preferably better than 130/80. Systolic BP (the top number) should generally stay higher than 100. Normal HR range is , as low as 50 might be acceptable. Please bring your blood pressure cuff to your appointments. For a list of validated BP cuffs: https://www.validatebp.org/  Recommended vaccines:  Influenza, annual  Prevnar-20 \"pneumonia\" vaccine  Respiratory Syncytial Virus (RSV)  Shingrix (shingles) vaccine series  Avoid taking Biotin (a vitamin, shows up particularly in hair/nail supplements) for a week prior to any blood tests, as it can interfere with certain results. Fasting for labs means 12 hours, nothing to eat or " drink, except water and medications, unless directed otherwise.    For assistance with scheduling referrals or consultations, please call [phone]. For laboratory, radiology, and other tests, please call [phone] ([phone] for pediatrics). Please review prescription inserts and published information for possible adverse effects of all medications. Return after testing or consultation to review results and recommendations, if symptoms persist, change, worsen, or return, or if you have any question or concern. If you do not get results within 7-10 days, or you have any question or concern, please send a message, call the office ([phone]), or return to the office for a follow-up appointment. For non-emergencies, you may call the office. For emergencies, call 9-1-1 or go to the nearest Emergency Department. Please schedule additional appointment(s) to address concern(s) not addressed today. An annual Wellness visit is strongly recommended. A Wellness visit should be dedicated to addressing routine health maintenance matters (e.g., cancer screenings, cardiovascular screening, etc.). Problem-focused visits, typically prompted by symptoms or specific concerns, are usually conducted separately, particularly if multiple or complex problems need to be addressed.    In general, results are not released or discussed over the telephone, but at an appointment or via  [employer]. Results of tests done through [facility] are released via  [employer] (see below).  [email or web address]   [employer] support line: [phone]

## 2024-09-19 NOTE — PROGRESS NOTES
"Subjective   Reason for Visit: Lis Brady is an 68 y.o. female here for Medicare Annual Wellness Visit Subsequent (Pt presents for MCV, 6 month check up, no concerns, discuss potassium. BL)     Past Medical, Surgical, and Family History reviewed and updated in chart.    Reviewed all medications by prescribing practitioner or clinical pharmacist (such as prescriptions, OTCs, herbal therapies and supplements) and documented in the medical record.    HPI  Historian(s): Self    Generally feeling well.     Occasionally checks BP at home,  usually a bit lower than today.    Patient Care Team:  Sunny Hoff DO as PCP - General (Family Medicine)  Sunny Hoff DO as PCP - [employer] PCP  Jennifer Tucker MD as Referring Physician (Dermatology)  Loy Cota MD as Surgeon (Ophthalmology)     Review of Systems   Constitutional:  Negative for chills, diaphoresis, fever and unexpected weight change.   Eyes:  Negative for visual disturbance.   Respiratory:  Negative for apnea (no PND), cough, choking, chest tightness, shortness of breath and wheezing.    Cardiovascular:  Negative for chest pain, palpitations and leg swelling.   Neurological:  Negative for dizziness, syncope, light-headedness and headaches.   All other systems reviewed and are negative.      Objective   Vitals:  /74   Pulse 69   Temp 36.5 °C (97.7 °F)   Ht 1.613 m (5' 3.5\")   Wt 78.6 kg (173 lb 6 oz)   SpO2 97%   BMI 30.23 kg/m²       No results found for: \"HGBA1C\", \"NONUHFIRE\"  No results found for: \"MICROALBCREA\"   Lab Results   Component Value Date    LDLF 125 (H) 09/12/2023    LDLF 164 (H) 10/04/2022    LDLF 121 (H) 09/19/2020     Lab Results   Component Value Date    TRIG 120 09/12/2023    TRIG 149 10/04/2022    TRIG 84 09/19/2020      Lab Results   Component Value Date    CREATININE 0.76 09/12/2023    CREATININE 0.86 10/04/2022    CREATININE 0.80 03/28/2022    CREATININE 0.92 11/01/2021    CREATININE 0.90 " "09/15/2021    CREATININE 0.88 09/06/2021    CREATININE 0.81 07/07/2021    CREATININE 0.85 05/20/2021    CREATININE 0.84 09/19/2020    CREATININE 0.79 05/31/2019     Lab Results   Component Value Date    GFRF 86 09/12/2023    GFRF 74 10/04/2022    GFRF 82 03/28/2022      No results found for: \"TSH\"       Physical Exam  Vitals and nursing note reviewed.   Constitutional:       General: She is not in acute distress.     Appearance: Normal appearance. She is not diaphoretic.      Comments: No assistive device presently being used.   HENT:      Head: Normocephalic and atraumatic.   Eyes:      General: No scleral icterus.     Extraocular Movements: Extraocular movements intact.      Conjunctiva/sclera: Conjunctivae normal.   Cardiovascular:      Rate and Rhythm: Normal rate and regular rhythm.      Heart sounds: Normal heart sounds.   Pulmonary:      Effort: Pulmonary effort is normal. No respiratory distress.      Breath sounds: Normal breath sounds.   Abdominal:      General: Bowel sounds are normal. There is no distension.      Palpations: Abdomen is soft. There is no mass.      Tenderness: There is no abdominal tenderness. There is no guarding or rebound.   Musculoskeletal:      Right lower leg: No edema.      Left lower leg: No edema.   Skin:     General: Skin is warm and dry.      Coloration: Skin is not jaundiced.   Neurological:      General: No focal deficit present.      Mental Status: She is alert and oriented to person, place, and time. Mental status is at baseline.   Psychiatric:         Mood and Affect: Mood normal.         Behavior: Behavior normal.         Thought Content: Thought content normal.         Assessment & Plan  Medicare annual wellness visit, subsequent  68yF doing fairly well.       Hypokalemia    Orders:    potassium chloride CR 10 mEq ER tablet; Take 1 tablet (10 mEq) by mouth once daily.    Basic Metabolic Panel; Future    Magnesium; Future    Essential (primary) hypertension  Well " controlled.   Orders:    potassium chloride CR 10 mEq ER tablet; Take 1 tablet (10 mEq) by mouth once daily.    amLODIPine (Norvasc) 2.5 mg tablet; Take 1 tablet (2.5 mg) by mouth once daily. as directed    chlorthalidone (Hygroton) 25 mg tablet; Take 1 tablet (25 mg) by mouth once daily.    losartan (Cozaar) 100 mg tablet; Take 1 tablet (100 mg) by mouth once daily.    TSH with reflex to Free T4 if abnormal; Future    Pure hypercholesterolemia  Therapeutic lifestyle changes. Declines statin or other cholesterol medication. Defers CT Coronary Artery Calcium Score.       Need for hepatitis C screening test    Orders:    Hepatitis C Antibody; Future    Breast cancer screening by mammogram    Orders:    BI mammo bilateral screening tomosynthesis; Future    Screening for osteoporosis    Orders:    XR DEXA bone density; Future    Menopause    Orders:    XR DEXA bone density; Future

## 2024-09-19 NOTE — ASSESSMENT & PLAN NOTE
Orders:    potassium chloride CR 10 mEq ER tablet; Take 1 tablet (10 mEq) by mouth once daily.    Basic Metabolic Panel; Future    Magnesium; Future

## 2024-10-10 ENCOUNTER — HOSPITAL ENCOUNTER (OUTPATIENT)
Dept: RADIOLOGY | Facility: HOSPITAL | Age: 69
Discharge: HOME | End: 2024-10-10
Payer: MEDICARE

## 2024-10-10 VITALS — HEIGHT: 65 IN | WEIGHT: 170 LBS | BODY MASS INDEX: 28.32 KG/M2

## 2024-10-10 DIAGNOSIS — Z78.0 MENOPAUSE: ICD-10-CM

## 2024-10-10 DIAGNOSIS — Z12.31 BREAST CANCER SCREENING BY MAMMOGRAM: ICD-10-CM

## 2024-10-10 DIAGNOSIS — Z13.820 SCREENING FOR OSTEOPOROSIS: ICD-10-CM

## 2024-10-10 PROCEDURE — 77067 SCR MAMMO BI INCL CAD: CPT

## 2024-10-10 PROCEDURE — 77080 DXA BONE DENSITY AXIAL: CPT

## 2024-10-10 PROCEDURE — 77063 BREAST TOMOSYNTHESIS BI: CPT | Performed by: STUDENT IN AN ORGANIZED HEALTH CARE EDUCATION/TRAINING PROGRAM

## 2024-10-10 PROCEDURE — 77067 SCR MAMMO BI INCL CAD: CPT | Performed by: STUDENT IN AN ORGANIZED HEALTH CARE EDUCATION/TRAINING PROGRAM

## 2024-10-11 DIAGNOSIS — R92.8 ABNORMAL MAMMOGRAM OF BOTH BREASTS: Primary | ICD-10-CM

## 2024-10-22 ENCOUNTER — HOSPITAL ENCOUNTER (OUTPATIENT)
Dept: RADIOLOGY | Facility: HOSPITAL | Age: 69
Discharge: HOME | End: 2024-10-22
Payer: MEDICARE

## 2024-10-22 DIAGNOSIS — R92.8 ABNORMAL MAMMOGRAM OF BOTH BREASTS: ICD-10-CM

## 2024-10-22 PROCEDURE — 77065 DX MAMMO INCL CAD UNI: CPT | Mod: LEFT SIDE | Performed by: RADIOLOGY

## 2024-10-22 PROCEDURE — G0279 TOMOSYNTHESIS, MAMMO: HCPCS | Mod: LEFT SIDE | Performed by: RADIOLOGY

## 2024-10-22 PROCEDURE — 77061 BREAST TOMOSYNTHESIS UNI: CPT | Mod: LT

## 2024-10-22 PROCEDURE — 76642 ULTRASOUND BREAST LIMITED: CPT | Mod: 50

## 2024-10-22 PROCEDURE — 76642 ULTRASOUND BREAST LIMITED: CPT | Mod: LEFT SIDE | Performed by: RADIOLOGY

## 2024-10-22 PROCEDURE — 76981 USE PARENCHYMA: CPT

## 2025-02-12 ENCOUNTER — APPOINTMENT (OUTPATIENT)
Dept: PRIMARY CARE | Facility: CLINIC | Age: 70
End: 2025-02-12
Payer: MEDICARE

## 2025-02-12 VITALS
BODY MASS INDEX: 28.86 KG/M2 | SYSTOLIC BLOOD PRESSURE: 162 MMHG | RESPIRATION RATE: 20 BRPM | HEIGHT: 65 IN | DIASTOLIC BLOOD PRESSURE: 86 MMHG | OXYGEN SATURATION: 97 % | HEART RATE: 77 BPM | WEIGHT: 173.2 LBS | TEMPERATURE: 97.5 F

## 2025-02-12 DIAGNOSIS — E87.6 HYPOKALEMIA: ICD-10-CM

## 2025-02-12 DIAGNOSIS — I10 ESSENTIAL (PRIMARY) HYPERTENSION: ICD-10-CM

## 2025-02-12 DIAGNOSIS — E78.00 PURE HYPERCHOLESTEROLEMIA: ICD-10-CM

## 2025-02-12 DIAGNOSIS — R73.9 HYPERGLYCEMIA: ICD-10-CM

## 2025-02-12 DIAGNOSIS — R30.0 DYSURIA: Primary | ICD-10-CM

## 2025-02-12 DIAGNOSIS — B37.2 YEAST DERMATITIS: ICD-10-CM

## 2025-02-12 DIAGNOSIS — N95.1 VAGINAL DRYNESS, MENOPAUSAL: ICD-10-CM

## 2025-02-12 PROBLEM — Z86.79 HISTORY OF HYPERTENSION: Status: ACTIVE | Noted: 2025-02-12

## 2025-02-12 PROBLEM — Z86.39 HISTORY OF ELEVATED LIPIDS: Status: ACTIVE | Noted: 2025-02-12

## 2025-02-12 PROBLEM — Z86.19 HISTORY OF VARICELLA: Status: ACTIVE | Noted: 2025-02-12

## 2025-02-12 PROBLEM — H65.90 SEROUS OTITIS MEDIA: Status: ACTIVE | Noted: 2025-02-12

## 2025-02-12 LAB
POC APPEARANCE, URINE: CLEAR
POC BILIRUBIN, URINE: NEGATIVE
POC BLOOD, URINE: NEGATIVE
POC COLOR, URINE: NORMAL
POC GLUCOSE, URINE: NEGATIVE MG/DL
POC KETONES, URINE: NEGATIVE MG/DL
POC LEUKOCYTES, URINE: NEGATIVE
POC NITRITE,URINE: NEGATIVE
POC PH, URINE: 8.5 PH
POC PROTEIN, URINE: NEGATIVE MG/DL
POC SPECIFIC GRAVITY, URINE: 1.02
POC UROBILINOGEN, URINE: 0.2 EU/DL

## 2025-02-12 PROCEDURE — 81003 URINALYSIS AUTO W/O SCOPE: CPT | Performed by: NURSE PRACTITIONER

## 2025-02-12 PROCEDURE — 1036F TOBACCO NON-USER: CPT | Performed by: NURSE PRACTITIONER

## 2025-02-12 PROCEDURE — 3008F BODY MASS INDEX DOCD: CPT | Performed by: NURSE PRACTITIONER

## 2025-02-12 PROCEDURE — 3077F SYST BP >= 140 MM HG: CPT | Performed by: NURSE PRACTITIONER

## 2025-02-12 PROCEDURE — 1159F MED LIST DOCD IN RCRD: CPT | Performed by: NURSE PRACTITIONER

## 2025-02-12 PROCEDURE — 1160F RVW MEDS BY RX/DR IN RCRD: CPT | Performed by: NURSE PRACTITIONER

## 2025-02-12 PROCEDURE — 99214 OFFICE O/P EST MOD 30 MIN: CPT | Performed by: NURSE PRACTITIONER

## 2025-02-12 PROCEDURE — 3079F DIAST BP 80-89 MM HG: CPT | Performed by: NURSE PRACTITIONER

## 2025-02-12 RX ORDER — ESTRADIOL 0.1 MG/G
2 CREAM VAGINAL DAILY
Qty: 42.5 G | Refills: 5 | Status: SHIPPED | OUTPATIENT
Start: 2025-02-12 | End: 2026-02-12

## 2025-02-12 RX ORDER — FLUCONAZOLE 150 MG/1
150 TABLET ORAL ONCE
Qty: 1 TABLET | Refills: 1 | Status: SHIPPED | OUTPATIENT
Start: 2025-02-12 | End: 2025-02-12

## 2025-02-12 RX ORDER — VIT C/E/ZN/COPPR/LUTEIN/ZEAXAN 250MG-90MG
400 CAPSULE ORAL DAILY
COMMUNITY

## 2025-02-12 ASSESSMENT — PATIENT HEALTH QUESTIONNAIRE - PHQ9
2. FEELING DOWN, DEPRESSED OR HOPELESS: NOT AT ALL
4. FEELING TIRED OR HAVING LITTLE ENERGY: NOT AT ALL
3. TROUBLE FALLING OR STAYING ASLEEP OR SLEEPING TOO MUCH: SEVERAL DAYS
SUM OF ALL RESPONSES TO PHQ9 QUESTIONS 1 AND 2: 0
1. LITTLE INTEREST OR PLEASURE IN DOING THINGS: NOT AT ALL
SUM OF ALL RESPONSES TO PHQ QUESTIONS 1-9: 2
10. IF YOU CHECKED OFF ANY PROBLEMS, HOW DIFFICULT HAVE THESE PROBLEMS MADE IT FOR YOU TO DO YOUR WORK, TAKE CARE OF THINGS AT HOME, OR GET ALONG WITH OTHER PEOPLE: NOT DIFFICULT AT ALL
6. FEELING BAD ABOUT YOURSELF - OR THAT YOU ARE A FAILURE OR HAVE LET YOURSELF OR YOUR FAMILY DOWN: SEVERAL DAYS
5. POOR APPETITE OR OVEREATING: NOT AT ALL
8. MOVING OR SPEAKING SO SLOWLY THAT OTHER PEOPLE COULD HAVE NOTICED. OR THE OPPOSITE, BEING SO FIGETY OR RESTLESS THAT YOU HAVE BEEN MOVING AROUND A LOT MORE THAN USUAL: NOT AT ALL
9. THOUGHTS THAT YOU WOULD BE BETTER OFF DEAD, OR OF HURTING YOURSELF: NOT AT ALL
7. TROUBLE CONCENTRATING ON THINGS, SUCH AS READING THE NEWSPAPER OR WATCHING TELEVISION: NOT AT ALL

## 2025-02-12 ASSESSMENT — ANXIETY QUESTIONNAIRES
7. FEELING AFRAID AS IF SOMETHING AWFUL MIGHT HAPPEN: NOT AT ALL
IF YOU CHECKED OFF ANY PROBLEMS ON THIS QUESTIONNAIRE, HOW DIFFICULT HAVE THESE PROBLEMS MADE IT FOR YOU TO DO YOUR WORK, TAKE CARE OF THINGS AT HOME, OR GET ALONG WITH OTHER PEOPLE: NOT DIFFICULT AT ALL
2. NOT BEING ABLE TO STOP OR CONTROL WORRYING: NOT AT ALL
5. BEING SO RESTLESS THAT IT IS HARD TO SIT STILL: NOT AT ALL
4. TROUBLE RELAXING: NOT AT ALL
GAD7 TOTAL SCORE: 0
3. WORRYING TOO MUCH ABOUT DIFFERENT THINGS: NOT AT ALL
1. FEELING NERVOUS, ANXIOUS, OR ON EDGE: NOT AT ALL
6. BECOMING EASILY ANNOYED OR IRRITABLE: NOT AT ALL

## 2025-02-12 NOTE — PROGRESS NOTES
Subjective   Lis Brady is a 69 y.o. female who presents for Establish Care (Check her vagina due to dryness/).  HPI    Lis is with concern of vaginal dryness and discomfort over the last several weeks.  Menopause x 16 years.  Experiences dyspareunia and dysuria.  Denies any hematuria, suprapubic or flank pain or fever or chills.  Cleaning the area does not seem to change the symptoms.  Will obtain a UA today.    Lis is also with hypertension.  When blood pressure is high she experiences headaches.  Denies headaches today.  Takes medication as prescribed of chlorthalidone 25 mg daily, amlodipine 2.5 mg daily, losartan 100 mg daily and potassium ER 10 mEq daily.  Denies any palpitations, chest pain or swelling.  She uses low-sodium diet.        Current Outpatient Medications on File Prior to Visit   Medication Sig Dispense Refill    amLODIPine (Norvasc) 2.5 mg tablet Take 1 tablet (2.5 mg) by mouth once daily. as directed 100 tablet 3    ascorbic acid (Vitamin C) 500 mg tablet Take 1 tablet (500 mg) by mouth once daily.      calcium citrate-vitamin D2 250 mg-2.5 mcg (100 unit) tablet Take 1 tablet by mouth 2 times a day.      chlorthalidone (Hygroton) 25 mg tablet Take 1 tablet (25 mg) by mouth once daily. 100 tablet 3    FLAXSEED OIL ORAL Take 1 capsule by mouth 1 (one) time each day.      losartan (Cozaar) 100 mg tablet Take 1 tablet (100 mg) by mouth once daily. 100 tablet 3    metroNIDAZOLE (Metrogel) 0.75 % gel APPLY THIN LAYER TOPICALLY TO AFFECTED AREA OF BREAKOUTS ON FACE TWICE DAILY      multivitamin-min-iron-FA-vit K (Adults Multivitamin) 18 mg iron-400 mcg-25 mcg tablet Take 1 tablet by mouth once daily.      potassium chloride CR 10 mEq ER tablet Take 1 tablet (10 mEq) by mouth once daily. 100 tablet 3    vitamin E 180 mg (400 unit) capsule Take 1 capsule (400 Units) by mouth once daily.       No current facility-administered medications on file prior to visit.        Patient Health  "Questionnaire-9 Score: 2     Feels well.  Reviewed PHQ-9 and DARYA-7.  No need for intervention.      Objective   /86   Pulse 77   Temp 36.4 °C (97.5 °F) (Temporal)   Resp 20   Ht 1.651 m (5' 5\")   Wt 78.6 kg (173 lb 3.2 oz)   SpO2 97%   BMI 28.82 kg/m²    Physical Exam  Vitals and nursing note reviewed.   Constitutional:       Appearance: Normal appearance.   HENT:      Head: Normocephalic and atraumatic.      Right Ear: Tympanic membrane normal.      Left Ear: Tympanic membrane normal.      Nose: Nose normal.      Mouth/Throat:      Mouth: Mucous membranes are moist.   Eyes:      Extraocular Movements: Extraocular movements intact.      Conjunctiva/sclera: Conjunctivae normal.      Pupils: Pupils are equal, round, and reactive to light.   Cardiovascular:      Rate and Rhythm: Normal rate and regular rhythm.      Pulses: Normal pulses.      Heart sounds: Normal heart sounds. No murmur heard.     No friction rub. No gallop.   Pulmonary:      Effort: Pulmonary effort is normal. No respiratory distress.      Breath sounds: Normal breath sounds.   Abdominal:      General: Abdomen is flat. Bowel sounds are normal.      Palpations: Abdomen is soft.   Genitourinary:     Vagina: Vaginal discharge present.      Comments: Mild erythema of vaginal mucosa and vulvar skin.  Noted curdy white flecks to area.  Musculoskeletal:         General: Normal range of motion.      Right lower leg: No edema.      Left lower leg: No edema.   Skin:     General: Skin is warm and dry.      Capillary Refill: Capillary refill takes less than 2 seconds.   Neurological:      Mental Status: She is alert and oriented to person, place, and time.      Cranial Nerves: No cranial nerve deficit.      Sensory: No sensory deficit.      Motor: No weakness.      Gait: Gait normal.   Psychiatric:         Mood and Affect: Mood normal.         Behavior: Behavior normal.       Assessment/Plan   Problem List Items Addressed This Visit      Labs ordered " for Medicare annual wellness September 2025.    May schedule for this visit.   Pure hypercholesterolemia    Relevant Orders    CBC and Auto Differential    Lipid Panel    Hemoglobin A1C    Comprehensive Metabolic Panel    Follow Up In Primary Care    TSH with reflex to Free T4 if abnormal    Essential (primary) hypertension  Blood pressure not well-controlled.  Second pressure 162/86.  Reports she takes medications as prescribed for blood pressure.  No headaches.  Follow-up 4 to 6 weeks with blood pressure values from home.  Consider increasing Norvasc from 2.5 mg to 5 mg if pressure is not well-controlled 150/90 and greater.    Relevant Orders    CBC and Auto Differential    Lipid Panel    Hemoglobin A1C    Comprehensive Metabolic Panel    Follow Up In Primary Care    TSH with reflex to Free T4 if abnormal    Hypokalemia    Relevant Orders    CBC and Auto Differential    Lipid Panel    Hemoglobin A1C    Comprehensive Metabolic Panel    Follow Up In Primary Care    TSH with reflex to Free T4 if abnormal     Other Visit Diagnoses       Dysuria    -  Primary  UA in office today without positive findings.    Relevant Orders    POCT UA Automated manually resulted (Completed)    Yeast dermatitis      Start fluconazole today.  If continues to have the symptoms may take the refill tablet in 1 week.    Relevant Medications    fluconazole (Diflucan) 150 mg tablet    Vaginal dryness, menopausal     KY lubricant not as effective.  Consider starting Estrace.      Relevant Medications    estradiol (Estrace) 0.01 % (0.1 mg/gram) vaginal cream    Hyperglycemia        Relevant Orders    CBC and Auto Differential    Lipid Panel    Hemoglobin A1C    Comprehensive Metabolic Panel    Follow Up In Primary Care    TSH with reflex to Free T4 if abnormal

## 2025-03-25 ENCOUNTER — APPOINTMENT (OUTPATIENT)
Dept: PRIMARY CARE | Facility: CLINIC | Age: 70
End: 2025-03-25
Payer: MEDICARE

## 2025-03-26 ENCOUNTER — APPOINTMENT (OUTPATIENT)
Dept: PRIMARY CARE | Facility: CLINIC | Age: 70
End: 2025-03-26
Payer: MEDICARE

## 2025-04-03 ASSESSMENT — ENCOUNTER SYMPTOMS
SWEATS: 0
NECK PAIN: 0
PALPITATIONS: 0
SHORTNESS OF BREATH: 0
BLURRED VISION: 0
ORTHOPNEA: 0
PND: 0
HYPERTENSION: 1
HEADACHES: 0

## 2025-04-04 ENCOUNTER — APPOINTMENT (OUTPATIENT)
Dept: PRIMARY CARE | Facility: CLINIC | Age: 70
End: 2025-04-04
Payer: MEDICARE

## 2025-04-04 VITALS
SYSTOLIC BLOOD PRESSURE: 150 MMHG | HEIGHT: 65 IN | TEMPERATURE: 96 F | WEIGHT: 172.8 LBS | OXYGEN SATURATION: 99 % | DIASTOLIC BLOOD PRESSURE: 70 MMHG | BODY MASS INDEX: 28.79 KG/M2 | HEART RATE: 79 BPM | RESPIRATION RATE: 20 BRPM

## 2025-04-04 DIAGNOSIS — E78.00 PURE HYPERCHOLESTEROLEMIA: ICD-10-CM

## 2025-04-04 DIAGNOSIS — E87.6 HYPOKALEMIA: ICD-10-CM

## 2025-04-04 DIAGNOSIS — I10 ESSENTIAL (PRIMARY) HYPERTENSION: Primary | ICD-10-CM

## 2025-04-04 PROCEDURE — 99213 OFFICE O/P EST LOW 20 MIN: CPT | Performed by: NURSE PRACTITIONER

## 2025-04-04 PROCEDURE — 3078F DIAST BP <80 MM HG: CPT | Performed by: NURSE PRACTITIONER

## 2025-04-04 PROCEDURE — 3008F BODY MASS INDEX DOCD: CPT | Performed by: NURSE PRACTITIONER

## 2025-04-04 PROCEDURE — 3077F SYST BP >= 140 MM HG: CPT | Performed by: NURSE PRACTITIONER

## 2025-04-04 PROCEDURE — 1160F RVW MEDS BY RX/DR IN RCRD: CPT | Performed by: NURSE PRACTITIONER

## 2025-04-04 PROCEDURE — 1159F MED LIST DOCD IN RCRD: CPT | Performed by: NURSE PRACTITIONER

## 2025-04-04 PROCEDURE — 1036F TOBACCO NON-USER: CPT | Performed by: NURSE PRACTITIONER

## 2025-04-04 RX ORDER — LOSARTAN POTASSIUM 100 MG/1
100 TABLET ORAL DAILY
Qty: 90 TABLET | Refills: 3 | Status: SHIPPED | OUTPATIENT
Start: 2025-04-04 | End: 2026-03-30

## 2025-04-04 RX ORDER — CHLORTHALIDONE 25 MG/1
25 TABLET ORAL DAILY
Qty: 90 TABLET | Refills: 3 | Status: SHIPPED | OUTPATIENT
Start: 2025-04-04 | End: 2026-03-30

## 2025-04-04 RX ORDER — FLUCONAZOLE 150 MG/1
TABLET ORAL
COMMUNITY
Start: 2025-02-12

## 2025-04-04 RX ORDER — AMLODIPINE BESYLATE 2.5 MG/1
2.5 TABLET ORAL DAILY
Qty: 90 TABLET | Refills: 3 | Status: SHIPPED | OUTPATIENT
Start: 2025-04-04 | End: 2026-03-30

## 2025-04-04 RX ORDER — POTASSIUM CHLORIDE 750 MG/1
10 TABLET, FILM COATED, EXTENDED RELEASE ORAL DAILY
Qty: 90 TABLET | Refills: 3 | Status: SHIPPED | OUTPATIENT
Start: 2025-04-04 | End: 2026-03-30

## 2025-04-04 ASSESSMENT — ENCOUNTER SYMPTOMS
PND: 0
ORTHOPNEA: 0
HYPERTENSION: 1
HEADACHES: 0
WEAKNESS: 0
BLURRED VISION: 0
NECK PAIN: 0
PALPITATIONS: 0
SHORTNESS OF BREATH: 0
SWEATS: 0

## 2025-04-04 NOTE — ASSESSMENT & PLAN NOTE
Orders:    amLODIPine (Norvasc) 2.5 mg tablet; Take 1 tablet (2.5 mg) by mouth once daily. as directed    chlorthalidone (Hygroton) 25 mg tablet; Take 1 tablet (25 mg) by mouth once daily.    losartan (Cozaar) 100 mg tablet; Take 1 tablet (100 mg) by mouth once daily.    potassium chloride CR 10 mEq ER tablet; Take 1 tablet (10 mEq) by mouth once daily.  Continue his medications above as Lis brought receipt of blood pressure monitoring's that are controlled within the last month.  \

## 2025-04-04 NOTE — ASSESSMENT & PLAN NOTE
Orders:    potassium chloride CR 10 mEq ER tablet; Take 1 tablet (10 mEq) by mouth once daily.

## 2025-04-04 NOTE — ASSESSMENT & PLAN NOTE
Changed diet as  is with fatty liver.  Omitted saturated and saturated foods from diet.  Increased vegetables and proteins.  Eating more of a Mediterranean diet.  Taking flaxseed supplement.  Discussed also adding omega-3 supplement, red yeast rice dietary fiber such as Metamucil

## 2025-04-04 NOTE — PROGRESS NOTES
Subjective   Patient ID: Lis Brady is a 69 y.o. female who presents for Follow-up.  Hypertension  This is a recurrent problem. The current episode started more than 1 year ago. The problem has been waxing and waning since onset. The problem is resistant. Pertinent negatives include no anxiety, blurred vision, chest pain, headaches, malaise/fatigue, neck pain, orthopnea, palpitations, peripheral edema, PND, shortness of breath or sweats. There are no associated agents to hypertension. Risk factors for coronary artery disease include dyslipidemia and post-menopausal state. There are no compliance problems.        Lis is here for blood pressure check.  She brought her receipt of blood pressures dating February 13 all the way through today April 4.  The highest being 166/76 dated 2/14 and the lowest dated March 31 of 121/79.    Continues with chlorthalidone 25 mg daily, losartan 1 mg daily and amlodipine 2.5 mg daily and potassium chloride 10 mEq daily for hypokalemia.  Denies any chest pain, palpitations or shortness of breath.  Also denies any weakness, presyncopal or syncopal episodes.  Without any swelling to lower extremities.    Recently changed diet and omitting saturated fats and unsaturated fats, increase of protein and vegetables.  Currently takes flaxseed supplement for glycemic and cholesterol control.    Medication Documentation Review Audit       Reviewed by MARLENE Jarvis (Nurse Practitioner) on 04/04/25 at 0933      Medication Order Taking? Sig Documenting Provider Last Dose Status   amLODIPine (Norvasc) 2.5 mg tablet [id number]  Take 1 tablet (2.5 mg) by mouth once daily. as directed Sunny Hoff, DO  Active   ascorbic acid (Vitamin C) 500 mg tablet [id number]  Take 1 tablet (500 mg) by mouth once daily. Historical Provider, MD  Active   calcium citrate-vitamin D2 250 mg-2.5 mcg (100 unit) tablet [id number]  Take 1 tablet by mouth 2 times a day. Historical Provider, MD  Active  "  chlorthalidone (Hygroton) 25 mg tablet [id number]  Take 1 tablet (25 mg) by mouth once daily. Sunny Hoff DO  Active   estradiol (Estrace) 0.01 % (0.1 mg/gram) vaginal cream [id number]  Insert 0.5 Applicatorfuls (2 g) into the vagina once daily. Apply to vagina nightly for 1 week then every Monday/Wednesday/Friday. Sola Oliver, APRN-CNP  Active   FLAXSEED OIL ORAL [id number]  Take 1 capsule by mouth 1 (one) time each day. Historical Provider, MD  Active   fluconazole (Diflucan) 150 mg tablet [id number] Yes TAKE ONE TABLET BY MOUTH DAILY for 1 dose Historical Provider, MD  Active   losartan (Cozaar) 100 mg tablet [id number]  Take 1 tablet (100 mg) by mouth once daily. Sunny Hoff DO  Active   metroNIDAZOLE (Metrogel) 0.75 % gel [id number]  APPLY THIN LAYER TOPICALLY TO AFFECTED AREA OF BREAKOUTS ON FACE TWICE DAILY Historical Provider, MD  Active   multivitamin-min-iron-FA-vit K (Adults Multivitamin) 18 mg iron-400 mcg-25 mcg tablet [id number]  Take 1 tablet by mouth once daily. Historical Provider, MD  Active   potassium chloride CR 10 mEq ER tablet [id number]  Take 1 tablet (10 mEq) by mouth once daily. Sunny Hoff DO  Active   vitamin E 180 mg (400 unit) capsule [id number]  Take 1 capsule (400 Units) by mouth once daily. Historical Provider, MD  Active                     Vitals:    04/04/25 0904 04/04/25 0947   BP: 150/70    Pulse: 74 79  Comment: apical   Resp: 20    Temp: 35.6 °C (96 °F)    TempSrc: Temporal    SpO2: 99%    Weight: 78.4 kg (172 lb 12.8 oz)    Height: 1.651 m (5' 5\")       Body mass index is 28.76 kg/m².     Review of Systems   Constitutional:  Negative for malaise/fatigue.   Eyes:  Negative for blurred vision.   Respiratory:  Negative for shortness of breath.    Cardiovascular:  Negative for chest pain, palpitations, orthopnea, leg swelling and PND.   Musculoskeletal:  Negative for neck pain.   Neurological:  Negative for weakness and headaches.       Objective   Physical " Exam  Vitals and nursing note reviewed.   Constitutional:       Appearance: Normal appearance.   HENT:      Head: Normocephalic and atraumatic.   Eyes:      Conjunctiva/sclera: Conjunctivae normal.   Cardiovascular:      Rate and Rhythm: Normal rate and regular rhythm.      Pulses: Normal pulses.      Heart sounds: Normal heart sounds. No murmur heard.     No friction rub. No gallop.      Comments: Apical 79 bpm.  Neurological:      Mental Status: She is alert.             Assessment/Plan   Assessment & Plan  Essential (primary) hypertension    Orders:    amLODIPine (Norvasc) 2.5 mg tablet; Take 1 tablet (2.5 mg) by mouth once daily. as directed    chlorthalidone (Hygroton) 25 mg tablet; Take 1 tablet (25 mg) by mouth once daily.    losartan (Cozaar) 100 mg tablet; Take 1 tablet (100 mg) by mouth once daily.    potassium chloride CR 10 mEq ER tablet; Take 1 tablet (10 mEq) by mouth once daily.  Continue his medications above as Lis brought receipt of blood pressure monitoring's that are controlled within the last month.  \  Hypokalemia    Orders:    potassium chloride CR 10 mEq ER tablet; Take 1 tablet (10 mEq) by mouth once daily.    Pure hypercholesterolemia       Changed diet as  is with fatty liver.  Omitted saturated and saturated foods from diet.  Increased vegetables and proteins.  Eating more of a Mediterranean diet.  Taking flaxseed supplement.  Discussed also adding omega-3 supplement, red yeast rice dietary fiber such as Metamucil       Follow-up in September for annual wellness and as needed.    MARLENE Jarvis 04/04/25 9:48 AM

## 2025-09-29 ENCOUNTER — APPOINTMENT (OUTPATIENT)
Dept: PRIMARY CARE | Facility: CLINIC | Age: 70
End: 2025-09-29
Payer: MEDICARE